# Patient Record
Sex: FEMALE | Race: WHITE | URBAN - METROPOLITAN AREA
[De-identification: names, ages, dates, MRNs, and addresses within clinical notes are randomized per-mention and may not be internally consistent; named-entity substitution may affect disease eponyms.]

---

## 2017-08-28 ENCOUNTER — HOSPITAL ENCOUNTER (EMERGENCY)
Facility: CLINIC | Age: 52
Discharge: HOME | End: 2017-08-28
Attending: EMERGENCY MEDICINE | Admitting: EMERGENCY MEDICINE
Payer: COMMERCIAL

## 2017-08-28 VITALS
BODY MASS INDEX: 28.34 KG/M2 | DIASTOLIC BLOOD PRESSURE: 61 MMHG | WEIGHT: 154 LBS | HEIGHT: 62 IN | RESPIRATION RATE: 16 BRPM | TEMPERATURE: 98.5 F | OXYGEN SATURATION: 98 % | SYSTOLIC BLOOD PRESSURE: 111 MMHG | HEART RATE: 68 BPM

## 2017-08-28 DIAGNOSIS — L03.211 FACIAL CELLULITIS: ICD-10-CM

## 2017-08-28 PROCEDURE — 99283 EMERGENCY DEPT VISIT LOW MDM: CPT

## 2017-08-28 PROCEDURE — 25000132 ZZH RX MED GY IP 250 OP 250 PS 637: Performed by: EMERGENCY MEDICINE

## 2017-08-28 RX ORDER — DOXYCYCLINE 100 MG/1
100 TABLET ORAL 2 TIMES DAILY
Qty: 20 TABLET | Refills: 0 | Status: SHIPPED | OUTPATIENT
Start: 2017-08-28 | End: 2017-09-07

## 2017-08-28 RX ORDER — DOXYCYCLINE 100 MG/1
100 CAPSULE ORAL ONCE
Status: COMPLETED | OUTPATIENT
Start: 2017-08-28 | End: 2017-08-28

## 2017-08-28 RX ADMIN — DOXYCYCLINE HYCLATE 100 MG: 100 CAPSULE, GELATIN COATED ORAL at 13:23

## 2017-08-28 NOTE — ED PROVIDER NOTES
"  History     Chief Complaint:  Wound Check     HPI   Alvino Yeung is a 52 year old female with history of cellulitis who presents to the emergency department today for evaluation of right cheek cellulitis. The patient reports three days ago the right side of her face was tender to the touch, and two days ago there was some redness over her right cheek. However, yesterday the redness worsened so she was seen in urgent care at which time she was placed on oral clindamycin but was told she may need to receive IV antibiotics if it persisted, prompting her presentation in the emergency department. She states she has taken 3 doses of clindamycin prior to arrival.  The cheek is \"slightly itchy, hard, and warm to the touch.\"  She has minimal pain over the redness. The patient denies rashes elsewhere, nausea, or vomiting. She denies any dental problems or fevers, with no problems chewing food, or bleeding from her nose or down her throat.  No known new exposures or bites.  Overall, she feels completely healthy and fine otherwise.  She does not want to be admitted to the hospital.     Hospitalized for 2 days for hand cellulitis, 2 years ago.    Allergies:  Coffee bean extract   Erythromycin      Medications:    CLINDAMYCIN HCL PO  ESTRADIOL PO  fluticasone (FLONASE) 50 MCG/ACT nasal spray  IBUPROFEN PO  LIOTHYRONINE SODIUM PO  Milnacipran HCl (SAVELLA PO)  PROGESTERONE MICRONIZED PO  Rizatriptan Benzoate (MAXALT PO)    Past Medical History:    Cellulitis   Thyroid disease     Past Surgical History:    History reviewed. No pertinent past surgical history.     Family History:    History reviewed. No pertinent family history.      Social History:  The patient was accompanied to the ED by herself.  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    Review of Systems   All other systems reviewed and are negative.    Physical Exam     Patient Vitals " "for the past 24 hrs:   BP Temp Temp src Pulse Resp SpO2 Height Weight   08/28/17 1203 111/61 98.5  F (36.9  C) Oral 68 16 98 % 1.575 m (5' 2\") 69.9 kg (154 lb)       Physical Exam  General: nontoxic appearing woman sitting upright  HENT: mucous membranes moist, L TM wnl, R TM wnl, OP clear, teeth nontender, tongue wnl, nose wnl  CV: regular rate, regular rhythm  Resp: normal effort, clear throughout, no crackles or wheezing  GI: abdomen soft and nontender, no guarding  MSK: no bony tenderness, mastoids nontender  Skin: moderate blanching erythema with slightly raised border covering majority of R cheek but sparing ear/nose/neck/forehead, no vesicles or pustules, no open wounds, no crepitus or fluctuance, no neck crepitus  Neuro: alert, clear speech, oriented  Psych: normal mood and affect    Emergency Department Course     Interventions:  Doxycycline 100 mg PO      Emergency Department Course:  Nursing notes and vitals reviewed.  I performed electronic chart review in EPIC.    1242 I performed an exam of the patient as documented above.   I discussed the treatment plan with the patient. They expressed understanding of this plan and consented to discharge. They will be discharged home with instructions for care and follow up. In addition, the patient will return to the emergency department if their symptoms persist, worsen, if new symptoms arise or if there is any concern.  All questions were answered.   Impression & Plan      Medical Decision Making:  She presents with concern for slight progression of her right sided facial erythema despite initiating clindamycin yesterday in clinic. She has normal vitals and appears non-toxic. I explained to her that a bacterial cellulitis is suspected but not confirmed. She agreed to defer any blood tests and repeatedly declined my offer for hospitalization for IV antibiotics and close monitoring. Given no resolution of symptoms and lack of identified immunocompromise, I felt " that trial of a different antibiotic was reasonable. She requested doxycycline as she has tolerated this well in the past, and this will also give some MRSA coverage. No evidence of a necrotizing infection.  This could be an erysipelas.  No dental pain to suggest an odontogenic source. Allergic phenomena and other causes of rash such as dermatitis and Lupus were considered but thought to be less likely.  I advised follow up in clinic within several days and return precautions for acute worsening.     Diagnosis:    ICD-10-CM    1. Facial cellulitis L03.211 presumed     Disposition:  Discharged to home with the below prescription      Discharge Medications:  New Prescriptions    DOXYCYCLINE MONOHYDRATE 100 MG TABS    Take 100 mg by mouth 2 times daily for 10 days     Juan Manuel Munoz  8/28/2017    EMERGENCY DEPARTMENT  Scribe Disclosure:  I, Juan Manuel Munoz, am serving as a scribe at 12:34 PM on 8/28/2017 to document services personally performed by Deejay Fernández MD based on my observations and the provider's statements to me.       Deejay Fernández MD  08/28/17 2889

## 2017-08-28 NOTE — ED AVS SNAPSHOT
Emergency Department    [address] [facility] [address]    Phone:  [phone]    Fax:  [phone]                                       Alvino Yeung   MRN: [id number]    Department:   Emergency Department   Date of Visit:  8/28/2017           Patient Information     Date Of Birth          1965        Your diagnoses for this visit were:     Facial cellulitis        You were seen by Deejay Fernández MD.      Follow-up Information     Follow up with Noe Ferrer MD. Schedule an appointment as soon as possible for a visit in 2 days.    Contact information:    [facility]  [address] [facility] [address]  [phone]          Follow up with  Emergency Department.    Specialty:  EMERGENCY MEDICINE    Why:  As needed, If symptoms worsen    Contact information:    [address] [facility] [id number] [phone]        Discharge Instructions         Facial Cellulitis  Cellulitis is an infection of the deep layers of skin. A break in the skin, such as a cut or scratch, can let bacteria under the skin. It may also occur from an infected oil gland (pimple) or hair follicle. If the bacteria get to deep layers of the skin, it can be serious. If not treated, cellulitis can get into the bloodstream and lymph nodes. The infection can then spread throughout the body. This causes serious illness.  Cellulitis causes the affected skin to become red, swollen, warm, and sore. The reddened areas have a visible border. You may have a fever, chills, and pain.  Cellulitis is treated with antibiotics taken for 7 to 10 days. Symptoms should get better 1 to 2 days after treatment is started. Make sure to take all the antibiotics for the full number of days until they are gone. Keep taking the medication even if your symptoms go away.  Home care  Follow these tips:    Take all of the antibiotic medicine exactly as directed until it is gone. Don t miss any doses,  especially during the first 7 days. Don t stop taking it when your symptoms get better.    Use a cool compress (face cloth soaked in cool water) on your face to help reduce swelling and pain.    You may use acetaminophen or ibuprofen to reduce pain. Don t use these if you have chronic liver or kidney disease, or ever had a stomach ulcer or gastrointestinal bleeding. Talk with your healthcare provider first.  Follow-up care  Follow up with your healthcare provider, or as advised. If your infection does not go away on the first antibiotic, your healthcare provider will prescribe a different one.  When to seek medical advice  Call your healthcare provider right away if any of these occur:    Fever higher of 100.4  F (38.0  C) or higher after 2 days on antibiotics    Red areas that spread    Swelling or pain that gets worse    Fluid leaking from the skin (pus)    An eyelid that swells shut or leaks fluid (pus)    Headache or neck pain that gets worse    Unusual drowsiness or confusion    Convulsions (seizure)    Change in eyesight     Date Last Reviewed: 9/1/2016 2000-2017 [employer]. [address] [place] [address]. All rights reserved. This information is not intended as a substitute for professional medical care. Always follow your healthcare professional's instructions.          24 Hour Appointment Hotline       To make an appointment at any [facility] clinic, call [phone] ([phone]). If you don't have a family doctor or clinic, we will help you find one. [facility] clinics are conveniently located to serve the needs of you and your family.             Review of your medicines      START taking        Dose / Directions Last dose taken    doxycycline Monohydrate 100 MG Tabs   Dose:  100 mg   Quantity:  20 tablet        Take 100 mg by mouth 2 times daily for 10 days   Refills:  0          Our records show that you are taking the medicines listed below. If these are incorrect,  please call your family doctor or clinic.        Dose / Directions Last dose taken    CLINDAMYCIN HCL PO        Refills:  0        ESTRADIOL PO   Dose:  1 mg        Take 1 mg by mouth daily   Refills:  0        Evening Primrose Oil        Refills:  0        fluticasone 50 MCG/ACT spray   Commonly known as:  FLONASE   Dose:  1 spray        Spray 1 spray into both nostrils daily   Refills:  0        IBUPROFEN PO   Dose:  200 mg        Take 200 mg by mouth every 6 hours as needed for moderate pain   Refills:  0        LIOTHYRONINE SODIUM PO   Dose:  7.5 mcg        Take 7.5 mcg by mouth daily   Refills:  0        MAGNESIUM OXIDE PO        Refills:  0        MAXALT PO   Dose:  10 mg        Take 10 mg by mouth daily as needed for migraine   Refills:  0        MELATONIN PO        Refills:  0        OMEGA-3 FISH OIL PO        Refills:  0        PROGESTERONE MICRONIZED PO   Dose:  75 mg        Take 75 mg by mouth daily   Refills:  0        SAVELLA PO   Dose:  50 mg        Take 50 mg by mouth 2 times daily   Refills:  0        vitamin B complex with vitamin C Tabs tablet   Dose:  1 tablet        Take 1 tablet by mouth daily   Refills:  0                Prescriptions were sent or printed at these locations (1 Prescription)                   Other Prescriptions                Printed at Department/Unit printer (1 of 1)         doxycycline Monohydrate 100 MG TABS                Orders Needing Specimen Collection     None      Pending Results     No orders found from 8/26/2017 to 8/29/2017.            Pending Culture Results     No orders found from 8/26/2017 to 8/29/2017.            Pending Results Instructions     If you had any lab results that were not finalized at the time of your Discharge, you can call the ED Lab Result RN at [phone]. You will be contacted by this team for any positive Lab results or changes in treatment. The nurses are available 7 days a week from 10A to 6:30P.  You can leave a message 24 hours per  day and they will return your call.        Test Results From Your Hospital Stay               Clinical Quality Measure: Blood Pressure Screening     Your blood pressure was checked while you were in the emergency department today. The last reading we obtained was  BP: 111/61 . Please read the guidelines below about what these numbers mean and what you should do about them.  If your systolic blood pressure (the top number) is less than 120 and your diastolic blood pressure (the bottom number) is less than 80, then your blood pressure is normal. There is nothing more that you need to do about it.  If your systolic blood pressure (the top number) is 120-139 or your diastolic blood pressure (the bottom number) is 80-89, your blood pressure may be higher than it should be. You should have your blood pressure rechecked within a year by a primary care provider.  If your systolic blood pressure (the top number) is 140 or greater or your diastolic blood pressure (the bottom number) is 90 or greater, you may have high blood pressure. High blood pressure is treatable, but if left untreated over time it can put you at risk for heart attack, stroke, or kidney failure. You should have your blood pressure rechecked by a primary care provider within the next 4 weeks.  If your provider in the emergency department today gave you specific instructions to follow-up with your doctor or provider even sooner than that, you should follow that instruction and not wait for up to 4 weeks for your follow-up visit.        Thank you for choosing [facility]       Thank you for choosing [facility] for your care. Our goal is always to provide you with excellent care. Hearing back from our patients is one way we can continue to improve our services. Please take a few minutes to complete the written survey that you may receive in the mail after you visit with us. Thank you!        [employer] Information     [employer] lets you send messages to your doctor,  "view your test results, renew your prescriptions, schedule appointments and more. To sign up, go to [email or web address] . Click on \"Log in\" on the left side of the screen, which will take you to the Welcome page. Then click on \"Sign up Now\" on the right side of the page.     You will be asked to enter the access code listed below, as well as some personal information. Please follow the directions to create your username and password.     Your access code is: [email or web address]  Expires: 2017  1:19 PM     Your access code will  in 90 days. If you need help or a new code, please call your [facility] clinic or [phone].        Care EveryWhere ID     This is your Care EveryWhere ID. This could be used by other organizations to access your [facility] medical records  [id number]        Equal Access to Services     [facility] : Hadsophie [place], wakristen lanier, bob elizabeth, jourdan haywood . So [facility] [phone].    ATENCIÓN: Si habla español, tiene a wall disposición servicios gratuitos de asistencia lingüística. Llame al [phone].    We comply with applicable federal civil rights laws and [place] laws. We do not discriminate on the basis of race, color, national origin, age, disability sex, sexual orientation or gender identity.            After Visit Summary       This is your record. Keep this with you and show to your community pharmacist(s) and doctor(s) at your next visit.                  "

## 2017-08-28 NOTE — DISCHARGE INSTRUCTIONS
Facial Cellulitis  Cellulitis is an infection of the deep layers of skin. A break in the skin, such as a cut or scratch, can let bacteria under the skin. It may also occur from an infected oil gland (pimple) or hair follicle. If the bacteria get to deep layers of the skin, it can be serious. If not treated, cellulitis can get into the bloodstream and lymph nodes. The infection can then spread throughout the body. This causes serious illness.  Cellulitis causes the affected skin to become red, swollen, warm, and sore. The reddened areas have a visible border. You may have a fever, chills, and pain.  Cellulitis is treated with antibiotics taken for 7 to 10 days. Symptoms should get better 1 to 2 days after treatment is started. Make sure to take all the antibiotics for the full number of days until they are gone. Keep taking the medication even if your symptoms go away.  Home care  Follow these tips:    Take all of the antibiotic medicine exactly as directed until it is gone. Don t miss any doses, especially during the first 7 days. Don t stop taking it when your symptoms get better.    Use a cool compress (face cloth soaked in cool water) on your face to help reduce swelling and pain.    You may use acetaminophen or ibuprofen to reduce pain. Don t use these if you have chronic liver or kidney disease, or ever had a stomach ulcer or gastrointestinal bleeding. Talk with your healthcare provider first.  Follow-up care  Follow up with your healthcare provider, or as advised. If your infection does not go away on the first antibiotic, your healthcare provider will prescribe a different one.  When to seek medical advice  Call your healthcare provider right away if any of these occur:    Fever higher of 100.4  F (38.0  C) or higher after 2 days on antibiotics    Red areas that spread    Swelling or pain that gets worse    Fluid leaking from the skin (pus)    An eyelid that swells shut or leaks fluid (pus)    Headache or  neck pain that gets worse    Unusual drowsiness or confusion    Convulsions (seizure)    Change in eyesight     Date Last Reviewed: 9/1/2016 2000-2017 [employer]. [address] [place] [address], [place] [address]. All rights reserved. This information is not intended as a substitute for professional medical care. Always follow your healthcare professional's instructions.

## 2017-08-28 NOTE — ED AVS SNAPSHOT
Emergency Department    [address] [place] [address]    Phone:  [phone]    Fax:  [phone]                                       Alvion Yeung   MRN: [id number]    Department:   Emergency Department   Date of Visit:  8/28/2017           After Visit Summary Signature Page     I have received my discharge instructions, and my questions have been answered. I have discussed any challenges I see with this plan with the nurse or doctor.    ..........................................................................................................................................  Patient/Patient Representative Signature      ..........................................................................................................................................  Patient Representative Print Name and Relationship to Patient    ..................................................               ................................................  Date                                            Time    ..........................................................................................................................................  Reviewed by Signature/Title    ...................................................              ..............................................  Date                                                            Time